# Patient Record
Sex: MALE | Race: WHITE
[De-identification: names, ages, dates, MRNs, and addresses within clinical notes are randomized per-mention and may not be internally consistent; named-entity substitution may affect disease eponyms.]

---

## 2020-01-10 ENCOUNTER — HOSPITAL ENCOUNTER (OUTPATIENT)
Dept: HOSPITAL 44 - RAD | Age: 20
End: 2020-01-10
Attending: FAMILY MEDICINE
Payer: COMMERCIAL

## 2020-01-10 DIAGNOSIS — J18.9: Primary | ICD-10-CM

## 2020-01-10 NOTE — DIAGNOSTIC IMAGING REPORT
PATIENT MR#:   [id number]

PATIENT ACCT#: [id number]

PATIENT NAME:  KELSEY CASPER 

YOB: 2000

REFERRING PHYSICIAN: Sarabjit Owusu

EXAM DATE:        1/10/2020

ACCESSION NUMBER: [id number]

EXAM DESCRIPTION: CHEST 2VIEW



PA and lateral chest



History:  Cough



PA and lateral chest dated January 10, 2020 demonstrates airspace disease in the retrocardiac region 
consistent with

pneumonia.  Heart size is normal.  The right lung is clear.  There is no pleural effusion.



Impression:



Retrocardiac airspace disease consistent with pneumonia.



Electronically signed by: Kavita Yepez on 01/10/2020 16:20:32









Read by:        Dr. Kavita Yepez

Transcribed by:   

Transcribed Date: 

Electronically signed by: Dr. Kavita Yepez

Date signed: 1/10/2020 4:25:44 PM